# Patient Record
Sex: MALE | Race: WHITE | Employment: FULL TIME | URBAN - METROPOLITAN AREA
[De-identification: names, ages, dates, MRNs, and addresses within clinical notes are randomized per-mention and may not be internally consistent; named-entity substitution may affect disease eponyms.]

---

## 2018-05-16 ENCOUNTER — OFFICE VISIT (OUTPATIENT)
Dept: ORTHOPEDIC SURGERY | Age: 53
End: 2018-05-16

## 2018-05-16 VITALS
WEIGHT: 240 LBS | SYSTOLIC BLOOD PRESSURE: 154 MMHG | HEIGHT: 72 IN | BODY MASS INDEX: 32.51 KG/M2 | DIASTOLIC BLOOD PRESSURE: 87 MMHG | HEART RATE: 68 BPM

## 2018-05-16 DIAGNOSIS — M19.012 OSTEOARTHRITIS OF LEFT SHOULDER, UNSPECIFIED OSTEOARTHRITIS TYPE: ICD-10-CM

## 2018-05-16 DIAGNOSIS — M25.512 LEFT SHOULDER PAIN, UNSPECIFIED CHRONICITY: Primary | ICD-10-CM

## 2018-05-16 PROCEDURE — 99203 OFFICE O/P NEW LOW 30 MIN: CPT | Performed by: ORTHOPAEDIC SURGERY

## 2018-05-16 RX ORDER — AMLODIPINE BESYLATE 5 MG/1
TABLET ORAL
COMMUNITY
Start: 2018-05-06

## 2018-05-16 RX ORDER — NEBIVOLOL HYDROCHLORIDE 10 MG/1
TABLET ORAL
COMMUNITY
Start: 2018-05-06

## 2018-05-16 RX ORDER — LOSARTAN POTASSIUM AND HYDROCHLOROTHIAZIDE 12.5; 1 MG/1; MG/1
TABLET ORAL
COMMUNITY
Start: 2018-05-06

## 2018-05-23 ENCOUNTER — OFFICE VISIT (OUTPATIENT)
Dept: ORTHOPEDIC SURGERY | Age: 53
End: 2018-05-23

## 2018-05-23 VITALS
HEART RATE: 70 BPM | HEIGHT: 72 IN | BODY MASS INDEX: 32.51 KG/M2 | SYSTOLIC BLOOD PRESSURE: 115 MMHG | WEIGHT: 240 LBS | DIASTOLIC BLOOD PRESSURE: 87 MMHG

## 2018-05-23 DIAGNOSIS — G54.5 PARSONAGE-TURNER SYNDROME: Primary | ICD-10-CM

## 2018-05-23 PROCEDURE — 99213 OFFICE O/P EST LOW 20 MIN: CPT | Performed by: ORTHOPAEDIC SURGERY

## 2018-06-08 ENCOUNTER — TELEPHONE (OUTPATIENT)
Dept: ORTHOPEDIC SURGERY | Age: 53
End: 2018-06-08

## 2018-06-14 ENCOUNTER — OFFICE VISIT (OUTPATIENT)
Dept: ORTHOPEDIC SURGERY | Age: 53
End: 2018-06-14

## 2018-06-14 VITALS
SYSTOLIC BLOOD PRESSURE: 140 MMHG | HEIGHT: 72 IN | BODY MASS INDEX: 32.52 KG/M2 | DIASTOLIC BLOOD PRESSURE: 80 MMHG | WEIGHT: 240.08 LBS | HEART RATE: 56 BPM

## 2018-06-14 DIAGNOSIS — G54.5 PARSONAGE-TURNER SYNDROME: Primary | ICD-10-CM

## 2018-06-14 DIAGNOSIS — M25.512 LEFT SHOULDER PAIN, UNSPECIFIED CHRONICITY: ICD-10-CM

## 2018-06-14 DIAGNOSIS — M19.012 OSTEOARTHRITIS OF LEFT SHOULDER, UNSPECIFIED OSTEOARTHRITIS TYPE: ICD-10-CM

## 2018-06-14 PROCEDURE — 99213 OFFICE O/P EST LOW 20 MIN: CPT | Performed by: ORTHOPAEDIC SURGERY

## 2018-06-22 ENCOUNTER — HOSPITAL ENCOUNTER (OUTPATIENT)
Dept: PHYSICAL THERAPY | Age: 53
Discharge: HOME | End: 2018-06-30
Admitting: ORTHOPAEDIC SURGERY

## 2018-07-01 ENCOUNTER — HOSPITAL ENCOUNTER (OUTPATIENT)
Dept: PHYSICAL THERAPY | Age: 53
Discharge: HOME | End: 2018-07-01
Attending: ORTHOPAEDIC SURGERY | Admitting: ORTHOPAEDIC SURGERY

## 2020-01-23 ENCOUNTER — HOSPITAL ENCOUNTER (INPATIENT)
Age: 55
LOS: 2 days | Discharge: HOME | DRG: 066 | End: 2020-01-25
Attending: EMERGENCY MEDICINE | Admitting: INTERNAL MEDICINE
Payer: COMMERCIAL

## 2020-01-23 ENCOUNTER — APPOINTMENT (OUTPATIENT)
Dept: CT IMAGING | Age: 55
DRG: 066 | End: 2020-01-23
Payer: COMMERCIAL

## 2020-01-23 PROBLEM — I63.9 ACUTE CVA (CEREBROVASCULAR ACCIDENT) (HCC): Status: ACTIVE | Noted: 2020-01-23

## 2020-01-23 LAB
ALBUMIN SERPL-MCNC: 4.5 G/DL (ref 3.4–5)
ALP BLD-CCNC: 73 U/L (ref 40–129)
ALT SERPL-CCNC: 50 U/L (ref 10–40)
APTT: 28.8 SEC (ref 24.2–36.2)
AST SERPL-CCNC: 33 U/L (ref 15–37)
BASE EXCESS VENOUS: 3 MMOL/L (ref -2–3)
BASOPHILS ABSOLUTE: 0.1 K/UL (ref 0–0.2)
BASOPHILS RELATIVE PERCENT: 0.6 %
BILIRUB SERPL-MCNC: 0.7 MG/DL (ref 0–1)
BILIRUBIN DIRECT: <0.2 MG/DL (ref 0–0.3)
BILIRUBIN, INDIRECT: ABNORMAL MG/DL (ref 0–1)
CARBOXYHEMOGLOBIN: 2.3 % (ref 0–1.5)
EOSINOPHILS ABSOLUTE: 0.2 K/UL (ref 0–0.6)
EOSINOPHILS RELATIVE PERCENT: 1.9 %
HCO3 VENOUS: 27.5 MMOL/L (ref 24–28)
HCT VFR BLD CALC: 44.8 % (ref 40.5–52.5)
HEMOGLOBIN, VEN, REDUCED: 20.6 %
HEMOGLOBIN: 15.6 G/DL (ref 13.5–17.5)
INR BLD: 0.97 (ref 0.86–1.14)
LYMPHOCYTES ABSOLUTE: 3.1 K/UL (ref 1–5.1)
LYMPHOCYTES RELATIVE PERCENT: 25.3 %
MCH RBC QN AUTO: 32.3 PG (ref 26–34)
MCHC RBC AUTO-ENTMCNC: 34.9 G/DL (ref 31–36)
MCV RBC AUTO: 92.6 FL (ref 80–100)
METHEMOGLOBIN VENOUS: 0.4 % (ref 0–1.5)
MONOCYTES ABSOLUTE: 1.1 K/UL (ref 0–1.3)
MONOCYTES RELATIVE PERCENT: 8.7 %
NEUTROPHILS ABSOLUTE: 7.8 K/UL (ref 1.7–7.7)
NEUTROPHILS RELATIVE PERCENT: 63.5 %
O2 SAT, VEN: 79 %
PCO2, VEN: 43.6 MMHG (ref 41–51)
PDW BLD-RTO: 13.5 % (ref 12.4–15.4)
PH VENOUS: 7.42 (ref 7.35–7.45)
PLATELET # BLD: 260 K/UL (ref 135–450)
PMV BLD AUTO: 7.7 FL (ref 5–10.5)
PO2, VEN: 41.5 MMHG (ref 25–40)
PRO-BNP: 42 PG/ML (ref 0–124)
PROTHROMBIN TIME: 11.2 SEC (ref 10–13.2)
RBC # BLD: 4.84 M/UL (ref 4.2–5.9)
TCO2 CALC VENOUS: 29 MMOL/L
TOTAL PROTEIN: 7.6 G/DL (ref 6.4–8.2)
TROPONIN: <0.01 NG/ML
WBC # BLD: 12.3 K/UL (ref 4–11)

## 2020-01-23 PROCEDURE — 85025 COMPLETE CBC W/AUTO DIFF WBC: CPT

## 2020-01-23 PROCEDURE — 85610 PROTHROMBIN TIME: CPT

## 2020-01-23 PROCEDURE — 93005 ELECTROCARDIOGRAM TRACING: CPT | Performed by: EMERGENCY MEDICINE

## 2020-01-23 PROCEDURE — 70496 CT ANGIOGRAPHY HEAD: CPT

## 2020-01-23 PROCEDURE — 83880 ASSAY OF NATRIURETIC PEPTIDE: CPT

## 2020-01-23 PROCEDURE — 82803 BLOOD GASES ANY COMBINATION: CPT

## 2020-01-23 PROCEDURE — 1200000000 HC SEMI PRIVATE

## 2020-01-23 PROCEDURE — 85730 THROMBOPLASTIN TIME PARTIAL: CPT

## 2020-01-23 PROCEDURE — 36415 COLL VENOUS BLD VENIPUNCTURE: CPT

## 2020-01-23 PROCEDURE — 6360000004 HC RX CONTRAST MEDICATION: Performed by: PHYSICIAN ASSISTANT

## 2020-01-23 PROCEDURE — 80076 HEPATIC FUNCTION PANEL: CPT

## 2020-01-23 PROCEDURE — 99285 EMERGENCY DEPT VISIT HI MDM: CPT

## 2020-01-23 PROCEDURE — 84484 ASSAY OF TROPONIN QUANT: CPT

## 2020-01-23 PROCEDURE — 70450 CT HEAD/BRAIN W/O DYE: CPT

## 2020-01-23 RX ORDER — SODIUM CHLORIDE 0.9 % (FLUSH) 0.9 %
10 SYRINGE (ML) INJECTION EVERY 12 HOURS SCHEDULED
Status: DISCONTINUED | OUTPATIENT
Start: 2020-01-23 | End: 2020-01-25 | Stop reason: HOSPADM

## 2020-01-23 RX ORDER — ATORVASTATIN CALCIUM 80 MG/1
80 TABLET, FILM COATED ORAL NIGHTLY
Status: DISCONTINUED | OUTPATIENT
Start: 2020-01-24 | End: 2020-01-25 | Stop reason: HOSPADM

## 2020-01-23 RX ORDER — AMLODIPINE BESYLATE 5 MG/1
5 TABLET ORAL NIGHTLY
Status: DISCONTINUED | OUTPATIENT
Start: 2020-01-24 | End: 2020-01-25 | Stop reason: HOSPADM

## 2020-01-23 RX ORDER — LABETALOL 20 MG/4 ML (5 MG/ML) INTRAVENOUS SYRINGE
10 EVERY 10 MIN PRN
Status: DISCONTINUED | OUTPATIENT
Start: 2020-01-23 | End: 2020-01-25 | Stop reason: HOSPADM

## 2020-01-23 RX ORDER — ASPIRIN 81 MG/1
81 TABLET ORAL DAILY
Status: DISCONTINUED | OUTPATIENT
Start: 2020-01-24 | End: 2020-01-25 | Stop reason: HOSPADM

## 2020-01-23 RX ORDER — ONDANSETRON 2 MG/ML
4 INJECTION INTRAMUSCULAR; INTRAVENOUS EVERY 6 HOURS PRN
Status: DISCONTINUED | OUTPATIENT
Start: 2020-01-23 | End: 2020-01-25 | Stop reason: HOSPADM

## 2020-01-23 RX ORDER — LOSARTAN POTASSIUM AND HYDROCHLOROTHIAZIDE 12.5; 1 MG/1; MG/1
1 TABLET ORAL DAILY
Status: DISCONTINUED | OUTPATIENT
Start: 2020-01-24 | End: 2020-01-25 | Stop reason: HOSPADM

## 2020-01-23 RX ORDER — SODIUM CHLORIDE 0.9 % (FLUSH) 0.9 %
10 SYRINGE (ML) INJECTION PRN
Status: DISCONTINUED | OUTPATIENT
Start: 2020-01-23 | End: 2020-01-25 | Stop reason: HOSPADM

## 2020-01-23 RX ORDER — ASPIRIN 300 MG/1
300 SUPPOSITORY RECTAL DAILY
Status: DISCONTINUED | OUTPATIENT
Start: 2020-01-24 | End: 2020-01-25 | Stop reason: HOSPADM

## 2020-01-23 RX ORDER — NEBIVOLOL 5 MG/1
10 TABLET ORAL DAILY
Status: DISCONTINUED | OUTPATIENT
Start: 2020-01-24 | End: 2020-01-25 | Stop reason: HOSPADM

## 2020-01-23 RX ADMIN — IOPAMIDOL 80 ML: 755 INJECTION, SOLUTION INTRAVENOUS at 20:41

## 2020-01-24 ENCOUNTER — APPOINTMENT (OUTPATIENT)
Dept: MRI IMAGING | Age: 55
DRG: 066 | End: 2020-01-24
Payer: COMMERCIAL

## 2020-01-24 LAB
ANION GAP SERPL CALCULATED.3IONS-SCNC: 15 MMOL/L (ref 3–16)
BILIRUBIN URINE: NEGATIVE
BLOOD, URINE: ABNORMAL
BUN BLDV-MCNC: 12 MG/DL (ref 7–20)
CALCIUM SERPL-MCNC: 9.4 MG/DL (ref 8.3–10.6)
CHLORIDE BLD-SCNC: 99 MMOL/L (ref 99–110)
CHOLESTEROL, TOTAL: 150 MG/DL (ref 0–199)
CLARITY: CLEAR
CO2: 24 MMOL/L (ref 21–32)
COLOR: YELLOW
CREAT SERPL-MCNC: 0.8 MG/DL (ref 0.9–1.3)
EKG ATRIAL RATE: 56 BPM
EKG DIAGNOSIS: NORMAL
EKG P AXIS: 55 DEGREES
EKG P-R INTERVAL: 188 MS
EKG Q-T INTERVAL: 450 MS
EKG QRS DURATION: 118 MS
EKG QTC CALCULATION (BAZETT): 434 MS
EKG R AXIS: 16 DEGREES
EKG T AXIS: 44 DEGREES
EKG VENTRICULAR RATE: 56 BPM
ESTIMATED AVERAGE GLUCOSE: 139.9 MG/DL
GFR AFRICAN AMERICAN: >60
GFR NON-AFRICAN AMERICAN: >60
GLUCOSE BLD-MCNC: 151 MG/DL (ref 70–99)
GLUCOSE URINE: NEGATIVE MG/DL
HBA1C MFR BLD: 6.5 %
HDLC SERPL-MCNC: 40 MG/DL (ref 40–60)
KETONES, URINE: NEGATIVE MG/DL
LDL CHOLESTEROL CALCULATED: 85 MG/DL
LEUKOCYTE ESTERASE, URINE: NEGATIVE
MICROSCOPIC EXAMINATION: YES
NITRITE, URINE: NEGATIVE
PH UA: 6 (ref 5–8)
POTASSIUM SERPL-SCNC: 3.5 MMOL/L (ref 3.5–5.1)
PROTEIN UA: NEGATIVE MG/DL
RBC UA: ABNORMAL /HPF (ref 0–2)
SODIUM BLD-SCNC: 138 MMOL/L (ref 136–145)
SPECIFIC GRAVITY UA: 1.02 (ref 1–1.03)
TRIGL SERPL-MCNC: 124 MG/DL (ref 0–150)
URINE REFLEX TO CULTURE: ABNORMAL
URINE TYPE: ABNORMAL
UROBILINOGEN, URINE: 0.2 E.U./DL
VLDLC SERPL CALC-MCNC: 25 MG/DL
WBC UA: ABNORMAL /HPF (ref 0–5)

## 2020-01-24 PROCEDURE — 92523 SPEECH SOUND LANG COMPREHEN: CPT

## 2020-01-24 PROCEDURE — 80048 BASIC METABOLIC PNL TOTAL CA: CPT

## 2020-01-24 PROCEDURE — 70551 MRI BRAIN STEM W/O DYE: CPT

## 2020-01-24 PROCEDURE — 97162 PT EVAL MOD COMPLEX 30 MIN: CPT

## 2020-01-24 PROCEDURE — 81001 URINALYSIS AUTO W/SCOPE: CPT

## 2020-01-24 PROCEDURE — 36415 COLL VENOUS BLD VENIPUNCTURE: CPT

## 2020-01-24 PROCEDURE — 80061 LIPID PANEL: CPT

## 2020-01-24 PROCEDURE — 97535 SELF CARE MNGMENT TRAINING: CPT

## 2020-01-24 PROCEDURE — 2580000003 HC RX 258: Performed by: INTERNAL MEDICINE

## 2020-01-24 PROCEDURE — 83036 HEMOGLOBIN GLYCOSYLATED A1C: CPT

## 2020-01-24 PROCEDURE — 6360000002 HC RX W HCPCS: Performed by: INTERNAL MEDICINE

## 2020-01-24 PROCEDURE — 1200000000 HC SEMI PRIVATE

## 2020-01-24 PROCEDURE — 6370000000 HC RX 637 (ALT 250 FOR IP): Performed by: INTERNAL MEDICINE

## 2020-01-24 PROCEDURE — 97116 GAIT TRAINING THERAPY: CPT

## 2020-01-24 PROCEDURE — 94660 CPAP INITIATION&MGMT: CPT

## 2020-01-24 PROCEDURE — 92610 EVALUATE SWALLOWING FUNCTION: CPT

## 2020-01-24 PROCEDURE — 97166 OT EVAL MOD COMPLEX 45 MIN: CPT

## 2020-01-24 RX ADMIN — ENOXAPARIN SODIUM 40 MG: 40 INJECTION SUBCUTANEOUS at 09:01

## 2020-01-24 RX ADMIN — Medication 10 ML: at 00:15

## 2020-01-24 RX ADMIN — AMLODIPINE BESYLATE 5 MG: 5 TABLET ORAL at 21:58

## 2020-01-24 RX ADMIN — ASPIRIN 81 MG: 81 TABLET, COATED ORAL at 09:01

## 2020-01-24 RX ADMIN — Medication 10 ML: at 09:01

## 2020-01-24 RX ADMIN — ATORVASTATIN CALCIUM 80 MG: 80 TABLET, FILM COATED ORAL at 21:58

## 2020-01-24 RX ADMIN — Medication 10 ML: at 21:59

## 2020-01-24 RX ADMIN — NEBIVOLOL HYDROCHLORIDE 10 MG: 5 TABLET ORAL at 09:01

## 2020-01-24 RX ADMIN — LOSARTAN POTASSIUM AND HYDROCHLOROTHIAZIDE 1 TABLET: 100; 12.5 TABLET, FILM COATED ORAL at 09:01

## 2020-01-24 ASSESSMENT — PAIN SCALES - GENERAL: PAINLEVEL_OUTOF10: 0

## 2020-01-24 NOTE — PROGRESS NOTES
Occupational Therapy   Occupational Therapy Initial Assessment/Tx Note  Date: 2020   Patient Name: Sushma Rivas  MRN: [id number]     : 1965    Date of Service: 2020  Assessment: Functional mobility and ADL performance decreased from baseline. Pt aware of R sided weakness, but not aware of all deficits such as decreased balance. Pt able to perform ADLs with CGA and increased time for fine motor control deficits. Pt demo decreased balance with functional mobilty. Pt would benefit from continued inpt OT services until d/c. Recommend OP OT services upon d/c. Discharge Recommendations:Chris Fields scored a 20/24 on the AM-PAC ADL Inpatient form. Current research shows that an AM-PAC score of 18 or greater is typically associated with a discharge to the patient's home setting. Based on the patients AM-PAC score and their current ADL deficits, it is recommended that the patient have 2-3 sessions per week of Occupational Therapy at d/c to increase the patients independence. See assessment. OT Equipment Recommendations  Equipment Needed: No    Assessment   Performance deficits / Impairments: Decreased functional mobility ; Decreased strength;Decreased balance;Decreased high-level IADLs;Decreased fine motor control  Assessment: Functional mobility and ADL performance decreased from baseline. Pt aware of R sided weakness, but not aware of all deficits such as decreased balance. Pt able to perform ADLs with CGA and increased time for fine motor control deficits. Pt demo decreased balance with functional mobilty. Pt would benefit from continued inpt OT services until d/c. Recommend OP OT services upon d/c.    Treatment Diagnosis: decreased activity tolerance; impaired ADLs, fine motor control and balance   Decision Making: Medium Complexity  OT Education: OT Role;Plan of Care  REQUIRES OT FOLLOW UP: Yes  Activity Tolerance  Activity Tolerance: Patient Tolerated treatment well  Safety Devices  Safety Devices in place: Yes  Type of devices: Call light within reach;Nurse notified; Left in chair;Chair alarm in place           Treatment Diagnosis: decreased activity tolerance; impaired ADLs, fine motor control and balance       Restrictions  Position Activity Restriction  Other position/activity restrictions: Up as tolerated    Subjective   General  Chart Reviewed: Yes  Patient assessed for rehabilitation services?: Yes  Additional Pertinent Hx: 62 y.o. M admitted 1/23 for R sided weakness and slurred speech. CT head/neck shows no intracranial large vessel occlusion or significant stenosis. PMHx: HTN. Family / Caregiver Present: Yes(Friend entered room mid therapy session )  Referring Practitioner: Jacobo Tijerina  Diagnosis: Acute CVA   Subjective  Subjective: Pt in bed upon arrival. Pleasant and agreeable to therapy. \" I didnt realize how difficult it is to comb my hair now that my arm is weak. \"   Patient Currently in Pain: Denies  Pain Assessment  Pain Assessment: 0-10(No c/o pain )    Social/Functional History  Social/Functional History  Lives With: Family(Mother)  Type of Home: House  Home Layout: Bed/Bath upstairs  Home Access: Stairs to enter without rails  Entrance Stairs - Number of Steps: 3 long ANTON  Bathroom Shower/Tub: Tub/Shower unit  Bathroom Toilet: Handicap height  Bathroom Equipment: Hand-held shower  Bathroom Accessibility: Accessible  Home Equipment: 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 Brother is coming from [facility] [place] to stay for a while and help as needed.        Objective   Vision: Impaired  Hearing: Within functional limits

## 2020-01-24 NOTE — H&P
Hospital Medicine History & Physical      PCP: Krishan Cuadra MD    Date of Admission: 1/23/2020    Date of Service: Pt seen/examined on 1/23/2020 and Admitted to Inpatient with expected LOS greater than two midnights due to medical therapy. Chief Complaint: Slurred speech and right-sided weakness      History Of Present Illness:     62 y.o. male who presents with slurred speech that started around 9:30 AM today. Patient was not very concerned and did not seek medical attention at that time. Around 8:30 PM today patient started having gait problem/imbalance due to right-sided weakness-arm and the leg both. Patient was not able to ambulate due to this. Patient denies prior history of TIA or stroke or heart disease    Patient presented to the ER well out of 3-hour window since onset of his symptoms at 9:30 AM today. Stroke team was consulted and patient was not a t-PA candidate. According to the patient his speech is improving significantly, but completely not at his baseline. He was able to ambulate to the bathroom after coming upstairs to the room with supervision. Patient states that his right-sided weakness is almost resolved. Feels that his speech is slightly slow but improving. Has history of obstructive sleep apnea and uses CPAP and history of hypertension. Denies tobacco smoking, alcohol or drug use. States he smokes cigars few times a year on special occasions. Past Medical History:          Diagnosis Date    Hypertension        Past Surgical History:      History reviewed. No pertinent surgical history. Medications Prior to Admission:      Prior to Admission medications    Medication Sig Start Date End Date Taking?  Authorizing Provider   BYSTOLIC 10 MG tablet  8/7/83   Historical Provider, MD   amLODIPine (NORVASC) 5 MG tablet  5/6/18   Historical Provider, MD   losartan-hydrochlorothiazide (HYZAAR) 100-12.5 MG per tablet  5/6/18   Historical Provider, MD       Allergies: 12.3*   HGB 15.6   HCT 44.8        No results for input(s): NA, K, CL, CO2, BUN, CREATININE, CALCIUM, PHOS in the last 72 hours. Invalid input(s): MAGNES  Recent Labs     01/23/20 2030   AST 33   ALT 50*   BILIDIR <0.2   BILITOT 0.7   ALKPHOS 73     Recent Labs     01/23/20 2030   INR 0.97     Recent Labs     01/23/20 2030   TROPONINI <0.01       Urinalysis:    No results found for: Oscar Ziegler, [address], [place] [address], RBCUA, BLOODU, [address], [place] [address]    Radiology:     CXR: I have reviewed the CXR with the following interpretation: NA  EKG:  I have reviewed the EKG with the following interpretation: Ordered    CTA HEAD NECK W CONTRAST   Final Result      No carotid or vertebral artery stenosis below skull base         CTA HEAD:      ANTERIOR CIRCULATION: There is mild calcification of the left internal carotid artery cavernous segment without evidence for significant stenosis. The intracranial right internal carotid artery is normal. Bilateral anterior and middle cerebral arteries    are normal. There is patent anterior communicating artery. POSTERIOR CIRCULATION: There is mild focal calcification of the intracranial right internal carotid artery without significant stenosis. Left internal carotid artery is normal. The basilar artery and branch vessels are normal. Prominent right posterior    communicating artery is visible. INTRACRANIAL VENOUS SYSTEM: No evidence for intracranial venous thrombosis. INTRACRANIAL HEMORRHAGE: None. VENTRICLES: Normal in size and configuration for age. BRAIN PARENCHYMA: Gray-white matter differentiation is normal. No intracranial mass effect. SKULL: No destructive osseous process or fracture. PARANASAL SINUSES / MASTOIDS: No acute sinusitis or mastoiditis. EXTRACRANIAL SOFT TISSUES: Normal.      IMPRESSION:      No intracranial large vessel occlusion or significant stenosis      CT HEAD WO CONTRAST   Final Result      1.  No intracranial

## 2020-01-24 NOTE — ED NOTES
.Patient transferred to 5th floor with report given to Cynthia Schmitt.       Luciana Barrientos, RN  01/23/20 2320

## 2020-01-24 NOTE — PROGRESS NOTES
Hospitalist Progress Note      PCP: Catherine Solitario MD    Date of Admission: 1/23/2020    Chief Complaint: Slurred speech, R sided weakness    Subjective:  Patient seen and examined at the bedside. No complaints at this time. Continues to have some dysarthria and right sided weakness. PFHS: reviewed as documented 1/23/2020, no changes      Medications:  Reviewed    Infusion Medications   Scheduled Medications    amLODIPine  5 mg Oral Nightly    nebivolol  10 mg Oral Daily    losartan-hydrochlorothiazide  1 tablet Oral Daily    sodium chloride flush  10 mL Intravenous 2 times per day    enoxaparin  40 mg Subcutaneous Daily    aspirin  81 mg Oral Daily    Or    aspirin  300 mg Rectal Daily    atorvastatin  80 mg Oral Nightly     PRN Meds: sodium chloride flush, magnesium hydroxide, ondansetron, perflutren lipid microspheres, labetalol      Intake/Output Summary (Last 24 hours) at 1/24/2020 0850  Last data filed at 1/24/2020 0053  Gross per 24 hour   Intake 100 ml   Output --   Net 100 ml         Physical Exam Performed:    BP (!) 150/88   Pulse 50   Temp 98.5 °F (36.9 °C) (Oral)   Resp 16   Ht 6' (1.829 m)   Wt 250 lb (113.4 kg)   SpO2 96%   BMI 33.91 kg/m²      General appearance:  No apparent distress, appears stated age  Eyes: Pupils equal, round, reactive to light, conjunctiva/corneas clear  Ears/Nose/Mouth/Throat: No external lesions or scars, hearing intact to voice  Neck: Trachea midline, no masses noted  Respiratory:  Normal respiratory effort. Clear to auscultation bilaterally  Cardiovascular: Regular rate and rhythm, nl S1/S2, w/o murmurs, rubs or gallops, no lower extremity edema  Abdomen: Soft, non-tender, non-distended, no hepatosplenomegaly  Musculoskeletal: No cyanosis, clubbing or petechiae, no lower extremity misalignment, asymmetry, or crepitation  Skin: Normal skin color, texture, turgor. No rashes or lesions noted.   Neuro: Dysarthric, otherwise CN grossly intact, Strength 4/5 large vessel occlusion or significant stenosis      CT HEAD WO CONTRAST   Final Result      1.  No intracranial hemorrhage, mass effect or large acute territorial infarction      MRI brain without contrast    (Results Pending)         Assessment/Plan:    Active Hospital Problems    Diagnosis Date Noted    Acute CVA (cerebrovascular accident) ([facility] [address] [I63.9] 01/23/2020       Plan:    # Acute CVA  -CT head, CTA head/neck, all negative  -Telemetry monitoring  -f/u A1C, lipid panel  -ASA, high-intensity statin  -Echo today  -MRI of head today  -Neurology consult  -PT/OT/SLP    # Hypertension  -Permissive HTN for 24h  -Then restart home medications as appropriate    # IMER  -Continue home CPAP    DVT Prophylaxis: Lovenox  Diet: DIET CARDIAC;  Code Status: Full Code    PT/OT Eval Status: ordered, pending    Antionette Em MD    # Medical Decision Making Complexity: high    Problem   New problem, additional workup (4): acute CVA    Established problem, stable (1): IMER on CPAP   Established problem, stable (1): Hypertension    Risk:  High:   Life-threatening Illness/Injury: Acute CVA

## 2020-01-24 NOTE — CARE COORDINATION
Case Management Assessment           Initial Evaluation                Date / Time of Evaluation: 1/24/2020 6:49 PM                 Assessment Completed by: Christin Conway     Spoke with patient regarding discharge needs and pt denies needs. He does not feel he will need any DME or home care. Pt will have transport to home. Patient Name: Melissa Latham     YOB: 1965  Diagnosis: Acute CVA (cerebrovascular accident) [facility]) [I63.9]     Date / Time: 1/23/2020  8:21 PM    Patient Admission Status: Inpatient    If patient is discharged prior to next notation, then this note serves as note for discharge by case management. Current PCP: Luz Fernandez MD  Clinic Patient: No    Chart Reviewed: Yes  Patient/ Family Interviewed: Yes    Initial assessment completed at bedside with: patient    Hospitalization in the last 30 days: No    Emergency Contacts:  Extended Emergency Contact Information  Primary Emergency Contact: Molly Cook [address] Phone: [phone]  Mobile Phone: [phone]  Relation: Parent    Advance Directives:   Code Status: Full [address]: No  Agent: NA  Contact Number: NA    Financial  Payor: Tyler Masters / Plan: Keegan Boyer PPO / Product Type: *No Product type* /     Pre-cert required for SNF: Yes    Pharmacy    [facility] [address] [place] [address] [phone]  [place]  [phone]  Phone: [phone] Fax: [phone]      Potential assistance Purchasing Medications: Potential Assistance Purchasing Medications: No  Does Patient want to participate in local refill/ meds to beds program?: No    Meds To Beds General Rules:  1. Can ONLY be done Monday- Friday between 8:30am-5pm  2. Prescription(s) must be in pharmacy by 3pm to be filled same day  3. Copy of patient's insurance/ prescription drug card and patient face sheet must be sent along with the prescription(s)  4.  Cost of Rx cannot be added to hospital bill. If financial assistance is needed, please contact unit  or ;  or  CANNOT provide pharmacy voucher for patients co-pays  5. Patients can then  the prescription on their way out of the hospital at discharge, or pharmacy can deliver to the bedside if staff is available. (payment due at time of pick-up or delivery - cash, check, or card accepted)     Able to afford home medications/ co-pay costs: Yes    ADLS  Support Systems: Parent    PT AM-PAC: 19 /24  OT AM-PAC: 20 /24    [place]: single family home  Steps: 3    Plans to RETURN to current housing: Yes  Barriers to RETURNING to current housing: none noted      DISCHARGE PLAN:  Disposition: Home- No Services Needed    Transportation PLAN for discharge: family     Factors facilitating achievement of predicted outcomes: Family support, Cooperative and Pleasant    Barriers to discharge: NA    Additional Case Management Notes: NA    The Plan for Transition of Care is related to the following treatment goals of Acute CVA (cerebrovascular accident) ([facility] [address] [I63.9]    The Patient and/or patient representative Merlin Toth and his family were provided with a choice of provider and agrees with the discharge plan Not Indicated    Freedom of choice list was provided with basic dialogue that supports the patient's individualized plan of care/goals and shares the quality data associated with the providers.  Not Indicated      Care Transition patient: Gissell Avrey Si, RN  [facility]  Case Management Department  Ph: [phone]   Fax: [phone]

## 2020-01-24 NOTE — PROGRESS NOTES
4 Eyes Admission Assessment     I agree as the admission nurse that 2 RN's have performed a thorough Head to Toe Skin Assessment on the patient. ALL assessment sites listed below have been assessed on admission. Areas assessed by both nurses:   [x]   Head, Face, and Ears   [x]   Shoulders, Back, and Chest  [x]   Arms, Elbows, and Hands   [x]   Coccyx, Sacrum, and Ischum  [x]   Legs, Feet, and Heels        Does the Patient have Skin Breakdown?   No         Francis Prevention initiated:  No   Wound Care Orders initiated:  No      [facility] nurse consulted for Pressure Injury (Stage 3,4, Unstageable, DTI, NWPT, and Complex wounds):  No      Nurse 1 eSignature: Electronically signed by Letty León RN on 1/24/20 at 3:24 AM    **SHARE this note so that the co-signing nurse is able to place an eSignature**    Nurse 2 eSignature: Electronically signed by Sonal Zuniga RN on 1/24/20 at 3:24 AM

## 2020-01-24 NOTE — PLAN OF CARE
Problem: Falls - Risk of:  Goal: Will remain free from falls  Description  Will remain free from falls  1/24/2020 1709 by Anastacio Zavala RN  Outcome: Met This Shift  1/24/2020 0313 by Carolina Brandon RN  Outcome: Ongoing  Note:   Patient is a high fall risk. All fall precautions in place: bed alarm on, nonskid socks on pt, call light within reach, bed locked in lowest position. Will continue to monitor pt safety. Goal: Absence of physical injury  Description  Absence of physical injury  Outcome: Met This Shift     Problem: HEMODYNAMIC STATUS  Goal: Patient has stable vital signs and fluid balance  1/24/2020 1709 by Anastacio Zavala RN  Outcome: Met This Shift  1/24/2020 0313 by Carolina Brandon RN  Outcome: Ongoing  Note:   VSS with exception to BP being slightly elevated. Pt passed swallow screen and is tolerating diet. Will continue to monitor.       Problem: ACTIVITY INTOLERANCE/IMPAIRED MOBILITY  Goal: Mobility/activity is maintained at optimum level for patient  Outcome: Met This Shift     Problem: COMMUNICATION IMPAIRMENT  Goal: Ability to express needs and understand communication  Outcome: Met This Shift

## 2020-01-24 NOTE — PROGRESS NOTES
Stroke Admission    I agree as the admission nurse that I have completed a thorough stroke assessment and completed the admission on the patient. ALL assessment areas listed below have been addressed and completed. Presentation: TIA    Handoff assessment completed with Pt transferred with ED tech, unable to assess with ED RN    Current NIHSS 4.     [x]   Education Assessment  [x]   Individualized Stroke/TIA Education template added, including patient specific risk factors: Hypertension, Overweight, Lack of exercise and Sleep apnea  [x]   Individualized Stroke/TIA Care Plan template added  [x]   Swallow screen completed using the [employer] Protocol, and documented on flowsheet PRIOR to oral intake: Pass  [x]   VTE Prophylaxis ordered/addressed; SCDs: N/A Lovenox  [x]   Stroke booklet given, and education initiated with patient and/or caregiver      Nurse eSignature: Electronically signed by Romelia Smith RN on 1/24/20 at 1:31 AM

## 2020-01-24 NOTE — ED NOTES
.Rounded on patient for pain, repositioning, and toileting needs. Call light and personal items within reach. Side rails up times 2.      Dannie Bynum RN  01/23/20 4930

## 2020-01-24 NOTE — PROGRESS NOTES
Speech Language Pathology  Facility/Department: [facility] 5T ORTHO/NEURO   CLINICAL BEDSIDE SWALLOW EVALUATION    NAME: Wallis Nissen  : 1965  MRN: [id number]    ADMISSION DATE: 2020  ADMITTING DIAGNOSIS: has Acute CVA (cerebrovascular accident) ([address] on their problem list.  ONSET DATE: 2020    Recent Chest Xray/CT of Chest: none    Date of Eval: 2020  Evaluating Therapist: Pua Pan    Current Diet level:  Current Diet : Regular  Current Liquid Diet : Thin    Primary Complaint  Patient Complaint: slurred speech    Pain:  Pain Assessment  Pain Assessment: 0-10  Pain Level: 0    Reason for Referral  Wallis Nissen was referred for a bedside swallow evaluation to assess the efficiency of his swallow function, identify signs and symptoms of aspiration and make recommendations regarding safe dietary consistencies, effective compensatory strategies, and safe eating environment. Impression  Dysphagia Diagnosis: Swallow function appears grossly intact  Dysphagia Impression :  Swallow function appears grossly intact  Dysphagia Outcome Severity Scale: Level 6: Within functional limits/Modified independence     Treatment Plan  Requires SLP Intervention: No  Duration/Frequency of Treatment: not indicated for dysphagia  D/C Recommendations: To be determined    Recommended Diet and Intervention  Diet Solids Recommendation: Regular  Liquid Consistency Recommendation: Thin  Recommended Form of Meds: PO    Compensatory Swallowing Strategies  N/A    Treatment/Goals  No dysphagia goals at this time    General  Chart Reviewed: Yes  Subjective  Subjective: Pleasant and cooperative. Indicates that he had no difficulty eating breakfast.    Behavior/Cognition: Alert; Cooperative;Pleasant mood  Temperature Spikes Noted: No(per flowsheet)  Respiratory Status: Room air  Breath Sounds: (WDL per flowsheet)  O2 Device: None (Room air)  Communication Observation: Dysarthria  Follows Directions: Complex  Dentition:

## 2020-01-24 NOTE — PROGRESS NOTES
arms freelying without LOB or near LOB. Min sway with NBOS, mod sway with NBOS with eyes closed. )  Standing - Dynamic: Fair(Near LOB into wall with turning in [place], occ veers to the R- moreso towards end of trial. Occ stagger when commanded to look L,R, up, down during amb. Able to stop abruptly without LOB when commanded)  Comments: Good balance upon standing, able to look over L shoulder to talk to friend, move arms freelying without LOB or near LOB. Treatment included gait and transfer training, pt education. Plan   Plan  Times per week: 5-7  Current Treatment Recommendations: Functional Mobility Training, Transfer Training, Gait Training, Balance Training, Stair training, Patient/Caregiver Education & Training  Safety Devices  Type of devices: Call light within reach, Chair alarm in place, Left in chair, Nurse notified, Gait belt    AM-PAC Score  AM-PAC Inpatient Mobility Raw Score : 19 (01/24/20 1508)  AM-PAC Inpatient T-Scale Score : 45.44 (01/24/20 1508)  Mobility Inpatient CMS 0-100% Score: 41.77 (01/24/20 1508)  Mobility Inpatient CMS G-Code Modifier : CK (01/24/20 1508)        Goals  Short term goals  Time Frame for Short term goals: Discharge  Short term goal 1: Pt will sit <-> without AD with supervision  Short term goal 2: Pt will amb 150 ft LRAD with supervision and no safety concerns  Short term goal 3: Pt will amb full flight of stairs with 1-2 railings with supervision  Patient Goals   Patient goals : Return home and to PLOF     Therapy Time   Individual Concurrent Group Co-treatment   Time In 1320         Time Out 1348         Minutes 28           Timed Code Treatment Minutes: 13    Total Treatment Minutes: 28    If patient is discharged prior to next treatment, this not will serve as the discharge summary. Mell Adame, [facility]    Therapist was present, directed the patient's care, made skilled judgment, and was responsible for assessment and treatment of the patient.     Rosangela Rodas,

## 2020-01-24 NOTE — CONSULTS
Neurology Consult Note  Reason for Consult: \"Acute CVA\"  Chief complaint: \" My right side is weak\"  Dr Lio Rapp MD asked me to see Brianna Engle in consultation for evaluation of \"Acute CVA\". History of Present Illness:  Brianna Engle is a 62 y.o. male who presents on 1/23/20 with complaints of speech difficulties and mild right sided weakness. The patient tells me that yesterday morning, he woke up in his normal state of health. Later that morning, he was on his computer and was had an abrupt onset of difficulty typing. He didn't think much of it, and laid down to rest to see if symptoms would resolve. Later that afternoon, he went downstairs to make dinner. He made dinner without any difficulty. He was walking across the room, when he suddenly noticed he was having a lot of difficulty walking. He believes this was related to his right leg becoming weak. Around that same time, he also noticed that his speech sounded slurred and like his \"tounge wasn't working\". His mother called EMS. Patient tells me that he was in the the squad and also noticed that his right arm was weak. In the ED, it was determined that he was not a TPA candidate given he was outside the time window. Per chart review, the patient's mother noticed speech changes at around 9:30 am yesterday morning that worsened with the onset of right arm and right leg weakness. Vessel imaging was also completed and was unremarkable for LVO or flow limiting stenosis. Per my interview with the patient, he states that symptoms have not changed since their onset. He still has difficulty walking given right leg weakness, but tells me that he is able to text with his right hand. He denies headache, feelings of the room spinning, double vision, and blurry vision. He has a history of hypertension, for which he takes medication. He is unsure of how well controlled his BP is.  He states that he only has it checked during provider appointments and his BP is typically in the 150s/90s during those visits. He has not had any recent changes to his medication. He denies history of atrial fibrillation, diabetes, hyperlipidemia, and smoking. He does not take a daily aspirin or statin at home. No history of stroke or TIA. Nothing like this has ever happened before. Medical History:  Past Medical History:   Diagnosis Date    Hypertension      History reviewed. No pertinent surgical history. Medications Prior to Admission: amLODIPine (NORVASC) 5 MG tablet,   BYSTOLIC 10 MG tablet,   losartan-hydrochlorothiazide (HYZAAR) 100-12.5 MG per tablet,   No Known Allergies  History reviewed. No pertinent family history. Social History     Tobacco Use   Smoking Status Never Smoker   Smokeless Tobacco Never Used     Social History     Substance and Sexual Activity   Drug Use Never     Social History     Substance and Sexual Activity   Alcohol Use Yes    Comment: occ       ROS:  Constitutional- No weight loss or fevers  Eyes- No diplopia. No photophobia. Ears/nose/throat- No dysphagia. + Dysarthria  Cardiovascular- No palpitations. No chest pain  Respiratory- No dyspnea. No Cough  Gastrointestinal- No Abdominal pain. No Vomiting. Genitourinary- No incontinence. No urinary retention  Musculoskeletal- No myalgia. No arthralgia  Skin- No rash. No easy bruising. Psychiatric- No depression. No anxiety  Endocrine- No diabetes. No thyroid issues. Hematologic- No bleeding difficulty. No fatigue  Neurologic- + weakness. No Headache. Exam:  Blood pressure (!) 155/91, pulse 54, temperature 98.5 °F (36.9 °C), temperature source Oral, resp. rate 16, height 6' (1.829 m), weight 250 lb (113.4 kg), SpO2 96 %. Constitutional    Vital signs: BP, HR, and RR reviewed   General Alert, no distress, well-nourished  Eyes: Unable to visualize the fundi  Cardiovascular: pulses symmetric in all 4 extremities. No peripheral edema.   Psychiatric: cooperative with examination, no  psychotic behavior noted. Neurologic  Mental status: Eyes open spontaneously  orientation to person, place, month, year   General fund of knowledge grossly intact, able to name current and preceding president    Memory grossly intact, able to name 1st president    Attention intact as able to attend well to the exam, able to read the clock and calculate coins   Language fluent in conversation   Comprehension intact; follows simple commands as well as 2 step commands crossing the midline  Cranial nerves:   CN2: Visual Fields full w/o extinction on confrontational testing  CN 3,4,6: extraocular muscles intact, ANDRES  CN5: facial sensation symmetric   CN7:face symmetric with mild dysarthria  CN8: hearing grossly intact  CN9: palate elevated symmetrically  CN11: trap full strength on shoulder shrug  CN12: tongue midline with protrusion  Strength: + slight drift on the RUE. No drift on the LUE. Right delt 4+/5, Right bicep/tricep 4/5, right  4/5, LUE 5/5, RLE 4/5, LLE 5/5, FFM decreased amplitude and coordination on the right, intact on the left  Deep tendon reflexes: BUEs areflexic, BLE 2+  Sensory: light touch intact in all 4 extremities. No sensory extinction on double simultaneous stimulation  Cerebellar/coordination: finger nose finger normal without ataxia, but clumsy on the right due to weakness  Tone: normal in all 4 extremities  Gait: Deferred for safety    Labs  Creatinine: 0.8  Glucose: 151  WBC: 12.3  A1C: 6.5      Studies  CT head  No intracranial hemorrhage, mass effect or large acute territorial infarction    CTA head   No intracranial large vessel occlusion or significant stenosis    CTA neck  No carotid or vertebral artery stenosis below skull base         Impression:  1. Dysarthria  2. Right hemiparesis  3. Hypertension    Genoveva Be is a 62 y.o. male who presented with right hemiparesis and dysarthria concerning for acute ischemic stroke. CT head unrevealing for acute changes.  Vessel

## 2020-01-24 NOTE — PROGRESS NOTES
multi-step, and complex directions with 100% accuracy)    Reading Comprehension  Reading Status: (not assessed at this time)    Expression  Primary Mode of Expression: Verbal  Verbal Expression  Verbal Expression: Exceptions to functional limits  Divergent: Mild (able to generate 15 semantically-related items per minute; able to generate 4 phonemically-related items per minute)    Written Expression  Dominant Hand: Right  Written Expression: Exceptions to [facility] (Mostly due to legibility. Spelling is intact.)    Motor Speech  Motor Speech: Exceptions to [facility]  Intelligibility: (100% at the conversation level)  Dysarthria : (Mild, but perceptible dysarthria. Pt reports that coordinating his mouth for speech becomes more difficult the more he talks.)    Pragmatics/Social Functioning  Pragmatics: Within functional limits    Cognition:   Orientation  Overall Orientation Status: Within Normal Limits  Attention  Attention: Within Functional Limits  Memory  Memory: Within Funtional Limits (recalls 5/5 words following 5-minute delay)  Numeric Reasoning  Numeric Reasoning: Within Functional Limits (serial 7 subtraction with 100% accuracy)    Prognosis:  Speech Therapy Prognosis  Prognosis: Excellent  Prognosis Considerations: Severity of Impairments; Age  Individuals consulted  Consulted and agree with results and recommendations: Patient;RN    Education:  Patient Education: SLP educated pt re: role of SLP, assessment results and POC. Patient Education Response: Verbalizes understanding  Safety Devices in place: Yes  Type of devices: Nurse notified; All fall risk precautions in place    Therapy Time:   Individual Concurrent Group Co-treatment   Time In 0855         Time Out 0915         Minutes 20                 Plan  Diet Recommendations:  Continue current diet (Regular texture and thin consistency liquids). Discharge Plan:  TBD  Discussed with RN, Ivis Burden Needs within reach.     Electronically Signed by:  Devonte Pierce Ayaka Huerta., [id number] [id number]  Pager #[phone]    This document will serve as a discharge summary if pt discharges before next treatment.    1/24/2020 9:46 AM

## 2020-01-24 NOTE — PLAN OF CARE
Problem: Falls - Risk of:  Goal: Will remain free from falls  Outcome: Ongoing  Note:   Patient is a high fall risk. All fall precautions in place: bed alarm on, nonskid socks on pt, call light within reach, bed locked in lowest position. Will continue to monitor pt safety. Problem: HEMODYNAMIC STATUS  Goal: Patient has stable vital signs and fluid balance  Outcome: Ongoing  Note:   VSS with exception to BP being slightly elevated. Pt passed swallow screen and is tolerating diet. Will continue to monitor.

## 2020-01-24 NOTE — ED NOTES
Patient brought by EMS for possible stroke like symptoms including aphasia, HA and gait abnormality. Patient reports symptoms began approx 0930 this am, worsened approx 1 hour ago.       Mary Jane Georges RN  01/23/20 2027

## 2020-01-24 NOTE — ED PROVIDER NOTES
ED Attending Attestation Note     Date of evaluation: 1/23/2020    This patient was seen by the advance practice provider. I have seen and examined the patient, agree with the workup, evaluation, management and diagnosis. The care plan has been discussed. I have reviewed the ECG and concur with the HENOK's interpretation. My assessment reveals a white male who presents with with speech difficulty (delay in getting words out, mild slur), also with mild right sided weakness. Patient lives with mom and reports he noticed difficulty with speech at 9:30am which did not improve throughout the day but rather got worse approximately 1 hour prior to arrival. FSBS wnl. On arrival sent to CT scan and stroke team contacted. Outside of time window and not eligible for tPA, will plan for admission.      Analisa Chanel MD  01/23/20 6171

## 2020-01-25 VITALS
TEMPERATURE: 98.1 F | WEIGHT: 250 LBS | HEART RATE: 53 BPM | BODY MASS INDEX: 33.86 KG/M2 | RESPIRATION RATE: 16 BRPM | DIASTOLIC BLOOD PRESSURE: 89 MMHG | SYSTOLIC BLOOD PRESSURE: 148 MMHG | HEIGHT: 72 IN | OXYGEN SATURATION: 93 %

## 2020-01-25 LAB
LV EF: 58 %
LVEF MODALITY: NORMAL

## 2020-01-25 PROCEDURE — 6370000000 HC RX 637 (ALT 250 FOR IP): Performed by: INTERNAL MEDICINE

## 2020-01-25 PROCEDURE — 97530 THERAPEUTIC ACTIVITIES: CPT

## 2020-01-25 PROCEDURE — 6360000004 HC RX CONTRAST MEDICATION: Performed by: INTERNAL MEDICINE

## 2020-01-25 PROCEDURE — 6360000002 HC RX W HCPCS: Performed by: INTERNAL MEDICINE

## 2020-01-25 PROCEDURE — 2580000003 HC RX 258: Performed by: INTERNAL MEDICINE

## 2020-01-25 PROCEDURE — 97116 GAIT TRAINING THERAPY: CPT

## 2020-01-25 PROCEDURE — C8929 TTE W OR WO FOL WCON,DOPPLER: HCPCS

## 2020-01-25 RX ORDER — ASPIRIN 81 MG/1
81 TABLET ORAL DAILY
Qty: 30 TABLET | Refills: 0 | Status: SHIPPED | OUTPATIENT
Start: 2020-01-26

## 2020-01-25 RX ORDER — CLOPIDOGREL BISULFATE 75 MG/1
300 TABLET ORAL ONCE
Status: COMPLETED | OUTPATIENT
Start: 2020-01-25 | End: 2020-01-25

## 2020-01-25 RX ORDER — CLOPIDOGREL BISULFATE 75 MG/1
75 TABLET ORAL DAILY
Qty: 30 TABLET | Refills: 0 | Status: SHIPPED | OUTPATIENT
Start: 2020-01-25

## 2020-01-25 RX ORDER — ATORVASTATIN CALCIUM 80 MG/1
80 TABLET, FILM COATED ORAL NIGHTLY
Qty: 30 TABLET | Refills: 3 | Status: SHIPPED | OUTPATIENT
Start: 2020-01-25

## 2020-01-25 RX ADMIN — LOSARTAN POTASSIUM AND HYDROCHLOROTHIAZIDE 1 TABLET: 100; 12.5 TABLET, FILM COATED ORAL at 11:01

## 2020-01-25 RX ADMIN — CLOPIDOGREL BISULFATE 300 MG: 75 TABLET ORAL at 14:24

## 2020-01-25 RX ADMIN — NEBIVOLOL HYDROCHLORIDE 10 MG: 5 TABLET ORAL at 11:00

## 2020-01-25 RX ADMIN — PERFLUTREN 1.65 MG: 6.52 INJECTION, SUSPENSION INTRAVENOUS at 09:30

## 2020-01-25 RX ADMIN — Medication 10 ML: at 11:04

## 2020-01-25 RX ADMIN — ASPIRIN 81 MG: 81 TABLET, COATED ORAL at 11:00

## 2020-01-25 RX ADMIN — ENOXAPARIN SODIUM 40 MG: 40 INJECTION SUBCUTANEOUS at 11:00

## 2020-01-25 ASSESSMENT — PAIN SCALES - GENERAL
PAINLEVEL_OUTOF10: 0
PAINLEVEL_OUTOF10: 0

## 2020-01-25 NOTE — PROGRESS NOTES
Occupational Therapy  Facility/Department: [facility] 5T ORTHO/NEURO  Daily Treatment Note  NAME: Mike Howe  : 1965  MRN: [id number]    Date of Service: 2020    Discharge Recommendations:    Mike Howe scored a 24/24 on the AM-PAC ADL Inpatient form. Current research shows that an AM-PAC score of 18 or greater is typically associated with a discharge to the patient's home setting. Based on the patients AM-PAC score and their current ADL deficits, it is recommended that the patient have 2-3 sessions per week of Occupational Therapy at d/c to increase the patients independence. OT Equipment Recommendations  Equipment Needed: No    Assessment   Performance deficits / Impairments: Decreased functional mobility ; Decreased strength;Decreased balance;Decreased high-level IADLs;Decreased fine motor control  Assessment: Pt with good progress and ready to go home. Treatment Diagnosis: decreased activity tolerance; impaired ADLs, fine motor control and balance   Prognosis: Good  Patient Education: HEP for coordination (handout provided); Pt verbalized understanding  REQUIRES OT FOLLOW UP: No  Activity Tolerance  Activity Tolerance: Patient Tolerated treatment well  Safety Devices  Safety Devices in place: Yes  Type of devices: Left in bed;Nurse notified     Restrictions  Position Activity Restriction  Other position/activity restrictions: Up as tolerated  Subjective   General  Chart Reviewed: Yes  Patient assessed for rehabilitation services?: Yes  Additional Pertinent Hx: 62 y.o. M admitted  for R sided weakness and slurred speech. CT head/neck shows no intracranial large vessel occlusion or significant stenosis. PMHx: HTN. Family / Caregiver Present: Yes  Referring Practitioner: Yojana Maloney  Diagnosis: Acute CVA   Subjective  Subjective: Pt sitting edge of bed upon entry. Pt expresses no concerns regarding discharge to home.   Vital Signs  Patient Currently in Pain: Denies Orientation  Orientation  Overall Orientation Status: Within Functional Limits  Objective    ADL  Grooming: Independent  LE Dressing: (Pt already dressed )  Toileting: (denied need)        Balance  Sitting Balance: Independent  Standing Balance: Independent  Standing Balance  Time: ~5 min  Activity: functional room mobility, bathroom mobility/activity  Functional Mobility  Functional - Mobility Device: No device  Activity: To/from bathroom; Other  Assist Level:  Independent     Transfers  Stand Step Transfers: Independent                       Cognition  Overall Cognitive Status: WFL                                         Plan   Plan Comment: Discharge pt from acute OT secondary to discharge to home       AM-PAC Score        AM-PAC Inpatient Daily Activity Raw Score: 24 (01/25/20 1529)  AM-PAC Inpatient ADL T-Scale Score : 57.54 (01/25/20 1529)  ADL Inpatient CMS 0-100% Score: 0 (01/25/20 1529)  ADL Inpatient CMS G-Code Modifier : [address] (01/25/20 1529)    Goals  Short term goals  Time Frame for Short term goals: by d/c   Short term goal 1: participate in lower body dressing routine - not met   Short term goal 2: dynamic standing during functional tasks with spvn      -MET-  Short term goal 3: gather supplies needed to complete ADL task with SBA     -MET-   Short term goal 4: independently complete HEP for RUE strength and coordination - not met        Therapy Time   Individual Concurrent Group Co-treatment   Time In 1443         Time Out 1453         Minutes 10         Timed Code Treatment Minutes: 10 Minutes    Total Treatment Time:10    JOCELINE Bailey/ERI [id number]

## 2020-01-25 NOTE — PROGRESS NOTES
Hospitalist Progress Note      PCP: Krishan Cuadra MD    Date of Admission: 1/23/2020    Chief Complaint: slurred speech, R sided weakness    Subjective:  Patient seen and examined at the bedside. No complaints at this time. Speech markedly improved, and is now at baseline as is his strength. Overall he feels at his baseline. PFHS: reviewed as documented 1/23/2020, no changes      Medications:  Reviewed    Infusion Medications   Scheduled Medications    clopidogrel  300 mg Oral Once    [Held by provider] amLODIPine  5 mg Oral Nightly    nebivolol  10 mg Oral Daily    losartan-hydrochlorothiazide  1 tablet Oral Daily    sodium chloride flush  10 mL Intravenous 2 times per day    enoxaparin  40 mg Subcutaneous Daily    aspirin  81 mg Oral Daily    Or    aspirin  300 mg Rectal Daily    atorvastatin  80 mg Oral Nightly     PRN Meds: sodium chloride flush, magnesium hydroxide, ondansetron, labetalol      Intake/Output Summary (Last 24 hours) at 1/25/2020 1154  Last data filed at 1/25/2020 0824  Gross per 24 hour   Intake 1050 ml   Output 0 ml   Net 1050 ml         Physical Exam Performed:    BP (!) 148/89   Pulse 53   Temp 98.1 °F (36.7 °C) (Oral)   Resp 16   Ht 6' (1.829 m)   Wt 250 lb (113.4 kg)   SpO2 93%   BMI 33.91 kg/m²     General appearance:  No apparent distress, appears stated age  Eyes: Pupils equal, round, reactive to light, conjunctiva/corneas clear  Ears/Nose/Mouth/Throat: No external lesions or scars, hearing intact to voice  Neck: Trachea midline, no masses noted  Respiratory:  Normal respiratory effort. Clear to auscultation bilaterally  Cardiovascular: Regular rate and rhythm, nl S1/S2, w/o murmurs, rubs or gallops, no lower extremity edema  Abdomen: Soft, non-tender, non-distended, no hepatosplenomegaly  Musculoskeletal: No cyanosis, clubbing or petechiae, no lower extremity misalignment, asymmetry, or crepitation  Skin: Normal skin color, texture, turgor.  No rashes or

## 2020-01-25 NOTE — PROGRESS NOTES
Neurology Progress Note    Patient: Sushma Rivas MRN: [id number]    YOB: 1965  Age: 62 y.o. Sex: male   Unit: [facility] Room/Bed: 8284/5320-85 Location: [address] [place] [address]    Today's Date: 1/25/2020  Date of Admission: 1/23/2020  8:21 PM  Admitting Physician: Fortino Hooper    Primary Care Physician: Lin Branes MD          LOS: 2 days      ASSESSMENT & RECOMMENDATIONS     Assessment  - MRI shows left pontine infarct, likely secondary to small vessel disease and not embolic    Recommendations  - Agree with Plavix load 300mg followed by 75mg daily along with ASA 81mg daily for next 30d  - At that point should switch to ASA 325mg monotherapy  - Maintain good secondary prevention of stroke measures including SBP < 140 mmHg AND DBP < 90 mmHg; euglycemia (HbA1c <7%); and LDL < 100 mg/dL, ideally < 70 mg/dL  - No need for further cardiac workup at this point  - He will follow-up with me in about 4 weeks. SUBJECTIVE     Chart reviewed, events noted, pt seen & examined. No acute events overnight. Afebrile. Feels much better today. States that he is at least 80% back to normal.     Review of Systems  No changes since pt last seen other than those noted above. PFSH  No change since the original history and note.      OBJECTIVE     Patient Vitals for the past 24 hrs:   BP Temp Temp src Pulse Resp SpO2   01/25/20 1036 (!) 148/89 98.1 °F (36.7 °C) Oral 53 16 93 %   01/25/20 0727 122/78 98.1 °F (36.7 °C) Oral 53 16 97 %   01/25/20 0332 -- -- -- -- 16 --   01/24/20 2206 134/79 98.3 °F (36.8 °C) Oral 51 16 96 %   01/24/20 2150 -- -- -- -- 16 --   01/24/20 1913 (!) 167/91 98.1 °F (36.7 °C) Oral 58 17 95 %   01/24/20 1445 (!) 143/90 98.2 °F (36.8 °C) Oral 57 16 94 %   01/24/20 1223 (!) 162/82 97.4 °F (36.3 °C) Oral 57 16 96 %       Neurological Exam (performed on 1/25/2020 at 1200):  -Mental status: A&O x3; pleasant & appropriate  -Speech & Language: no aphasia; no dysarthria  -Cranial nerves: pupils symmetric; no notable dysconjugate gaze; eyes midline; no facial asymmetry  -Motor: moving all extremities symmetrically and fully  -Other: no adventitious movements noted  Other Systems  -General Appearance: well-developed, well-nourished, no apparent distress  -Neck: supple  -Lungs: breathing unlabored, regular, no audible wheezes  -CV: pulses strong x4 extremities  -Abd: flat     Imaging: All reports below, not included in previous notes, personally reviewed & actual images reviewed where indicated. Pertinent positives & negatives are addressed in Assessment & Plan section of note  MRI brain without contrast  Images independently reviewed. Agree with findings. --[facility]    Final Result   Acute ischemic infarction involving the left aspect of misti. Otherwise, normal MRI of the brain. CTA HEAD NECK W CONTRAST  Images independently reviewed. Agree with findings. --[facility]    Final Result   No carotid or vertebral artery stenosis below skull base   No intracranial large vessel occlusion or significant stenosis      CT HEAD WO CONTRAST  Images independently reviewed. Agree with findings. --[facility]NR[facility]    Final Result   1. No intracranial hemorrhage, mass effect or large acute territorial infarction           Other Testing:  -  Echo pending      Laboratory Review: All results below, not included in previous notes, personally reviewed.  Pertinent positives & negatives are addressed in Assessment & Plan section of note  Recent Results (from the past 36 hour(s))   Hemoglobin A1c    Collection Time: 01/24/20  2:17 AM   Result Value Ref Range    Hemoglobin A1C 6.5 See comment %    eAG 139.9 mg/dL   Lipid panel - fasting    Collection Time: 01/24/20  2:17 AM   Result Value Ref Range    Cholesterol, Total 150 0 - 199 mg/dL    Triglycerides 124 0 - 150 mg/dL    HDL 40 40 - 60 mg/dL    LDL Calculated 85 <100 mg/dL    VLDL Cholesterol Calculated 25 Not Established mg/dL   Basic metabolic panel    Collection Time: 01/24/20  2:17 AM   Result Value Ref Range    Sodium 138 136 - 145 mmol/L    Potassium 3.5 3.5 - 5.1 mmol/L    Chloride 99 99 - 110 mmol/L    CO2 24 21 - 32 mmol/L    Anion Gap 15 3 - 16    Glucose 151 (H) 70 - 99 mg/dL    BUN 12 7 - 20 mg/dL    CREATININE 0.8 (L) 0.9 - 1.3 mg/dL    GFR Non-African American >60 >60    GFR African American >60 >60    Calcium 9.4 8.3 - 10.6 mg/dL   Urinalysis Reflex to Culture    Collection Time: 01/24/20  5:29 AM   Result Value Ref Range    Color, UA Yellow Straw/Yellow    Clarity, UA Clear Clear    Glucose, Ur Negative Negative mg/dL    Bilirubin Urine Negative Negative    Ketones, Urine Negative Negative mg/dL    Specific Gravity, UA 1.020 1.005 - 1.030    Blood, Urine TRACE-INTACT (A) Negative    pH, UA 6.0 5.0 - 8.0    Protein, UA Negative Negative mg/dL    Urobilinogen, Urine 0.2 <2.0 E.U./dL    Nitrite, Urine Negative Negative    Leukocyte Esterase, Urine Negative Negative    Microscopic Examination YES     Urine Type NotGiven     Urine Reflex to Culture Not Indicated    Microscopic Urinalysis    Collection Time: 01/24/20  5:29 AM   Result Value Ref Range    WBC, UA None seen 0 - 5 /HPF    RBC, UA 5-10 (A) 0 - 2 /HPF       Scheduled Meds:   clopidogrel  300 mg Oral Once    [Held by provider] amLODIPine  5 mg Oral Nightly    nebivolol  10 mg Oral Daily    losartan-hydrochlorothiazide  1 tablet Oral Daily    sodium chloride flush  10 mL Intravenous 2 times per day    enoxaparin  40 mg Subcutaneous Daily    aspirin  81 mg Oral Daily    Or    aspirin  300 mg Rectal Daily    atorvastatin  80 mg Oral Nightly       Continuous Infusions:       PRN Meds:  sodium chloride flush, 10 mL, PRN  magnesium hydroxide, 30 mL, Daily PRN  ondansetron, 4 mg, Q6H PRN  labetalol, 10 mg, Q10 Min PRN                Discussed at length with patient and nursing    Tory Sethi M.D.   Neurology  January 25, 2020

## 2020-01-25 NOTE — DISCHARGE SUMMARY
texture, turgor. No rashes or lesions noted. Psychiatric: Alert and oriented x4, good insight and judgment      Labs: For convenience and continuity at follow-up the following most recent labs are provided:      CBC:    Lab Results   Component Value Date    WBC 12.3 01/23/2020    HGB 15.6 01/23/2020    HCT 44.8 01/23/2020     01/23/2020       Renal:    Lab Results   Component Value Date     01/24/2020    K 3.5 01/24/2020    CL 99 01/24/2020    CO2 24 01/24/2020    BUN 12 01/24/2020    CREATININE 0.8 01/24/2020    CALCIUM 9.4 01/24/2020         Significant Diagnostic Studies    Radiology:   MRI brain without contrast   Final Result      Acute ischemic infarction involving the left aspect of misti. Otherwise, normal MRI of the brain. CTA HEAD NECK W CONTRAST   Final Result      No carotid or vertebral artery stenosis below skull base         CTA HEAD:      ANTERIOR CIRCULATION: There is mild calcification of the left internal carotid artery cavernous segment without evidence for significant stenosis. The intracranial right internal carotid artery is normal. Bilateral anterior and middle cerebral arteries    are normal. There is patent anterior communicating artery. POSTERIOR CIRCULATION: There is mild focal calcification of the intracranial right internal carotid artery without significant stenosis. Left internal carotid artery is normal. The basilar artery and branch vessels are normal. Prominent right posterior    communicating artery is visible. INTRACRANIAL VENOUS SYSTEM: No evidence for intracranial venous thrombosis. INTRACRANIAL HEMORRHAGE: None. VENTRICLES: Normal in size and configuration for age. BRAIN PARENCHYMA: Gray-white matter differentiation is normal. No intracranial mass effect. SKULL: No destructive osseous process or fracture. PARANASAL SINUSES / MASTOIDS: No acute sinusitis or mastoiditis.       EXTRACRANIAL SOFT TISSUES: Normal.

## 2020-01-25 NOTE — PROGRESS NOTES
Physical Therapy  Facility/Department: [facility] 5T ORTHO/NEURO  Daily Treatment Note  NAME: Celsa De La oTrre  : 1965  MRN: [id number]    Date of Service: 2020    Discharge Recommendations:    Celsa De La Torre scored a 23/24 on the AM-PAC short mobility form. Current research shows that an AM-PAC score of 17 or less is typically not associated with a discharge to the patient's home setting. Based on the patients AM-PAC score and their current functional mobility deficits, it is recommended that the patient have 5-7 sessions per week of Physical Therapy at d/c to increase the patients independence. Assessment   Body structures, Functions, Activity limitations: Decreased functional mobility ; Decreased coordination;Decreased ADL status; Decreased safe awareness;Decreased high-level IADLs;Decreased cognition;Decreased fine motor control;Decreased balance  Assessment: Pt with reducing deficits following CVA, did notice some dynamic standing/gait deficits that would warrant further outpatient PT services. Pt agreeable. Pt tentative D/C today home with supervision. Treatment Diagnosis: Gait difficulty 2/2 CVA  Decision Making: Medium Complexity  PT Education: PT Role;Plan of Care;General Safety;Gait Training;Transfer Training;Functional Mobility Training;Disease Specific Education  REQUIRES PT FOLLOW UP: Yes     Patient Diagnosis(es): The primary encounter diagnosis was Cerebrovascular accident (CVA), unspecified mechanism ([facility] [address] A diagnosis of Acute CVA (cerebrovascular accident) [facility]) was also pertinent to this visit. has a past medical history of Hypertension. has no past surgical history on file. Restrictions  Position Activity Restriction  Other position/activity restrictions: Up as tolerated  Subjective   General  Chart Reviewed: Yes  Additional Pertinent Hx:  62 y.o. male admit  for slurred speech and R sided weakness, difficulty walking. CT head and neck (-) for acute findings.  MRI

## 2020-01-26 ASSESSMENT — ENCOUNTER SYMPTOMS
SHORTNESS OF BREATH: 0
EYE PAIN: 0
COUGH: 0
ABDOMINAL PAIN: 0
SORE THROAT: 0
DIARRHEA: 0
ABDOMINAL DISTENTION: 0
COLOR CHANGE: 0
TROUBLE SWALLOWING: 0
CHEST TIGHTNESS: 0
NAUSEA: 0
WHEEZING: 0
VOMITING: 0
RHINORRHEA: 0

## 2020-01-27 NOTE — ED PROVIDER NOTES
[address] [facility] [address] ENCOUNTER          PHYSICIAN ASSISTANT NOTE       Date of evaluation: 1/23/2020    Chief Complaint     Aphasia and Gait Problem      History of Present Illness     Jhonatan Latif is a 62 y.o. male with a past medical history as noted below who presents to the Emergency Department with a complaint of a change in speech and right-sided weakness. The patient reports that at approximately 930 this morning, he noted that his speech had become more slurred than usual and that he was having a difficulty enunciating words. He reports that he can find the words that he wanted to say, but he had a difficult time getting the words out. He did not seek medical care at that time. The symptoms continued through much of the day until approximately 7:30 PM tonight, when he began to note some weakness in his right leg. He reports that he felt a little bit off balance, and would favor his right side as he was walking, often causing his gait to steer right. He also noted that he felt slightly weak in his left arm as well as some decrease in sensation to the left side of the face. As the speech symptoms had not subsided, the patient became concerned and activated EMS. On EMS evaluation, they reported a negative [facility] stroke scale noting that the patient had some slight slurring of speech, but no pronator drift. The patient has no history of cardiovascular disease or stroke. He does have a history of hypertension and obstructive sleep apnea. He denies any pain. Review of Systems     Review of Systems   Constitutional: Negative for chills, diaphoresis, fatigue and fever. HENT: Negative for congestion, postnasal drip, rhinorrhea, sore throat and trouble swallowing. Eyes: Negative for pain and visual disturbance. Respiratory: Negative for cough, chest tightness, shortness of breath and wheezing.     Cardiovascular: Negative for chest pain, palpitations and leg swelling. Gastrointestinal: Negative for abdominal distention, abdominal pain, diarrhea, nausea and vomiting. Endocrine: Negative for polydipsia and polyuria. Genitourinary: Negative for dysuria. Musculoskeletal: Negative for myalgias, neck pain and neck stiffness. Skin: Negative for color change, pallor and rash. Neurological: Positive for speech difficulty and weakness. Negative for dizziness, light-headedness and headaches. Hematological: Does not bruise/bleed easily. Psychiatric/Behavioral: Negative for confusion, hallucinations, self-injury and suicidal ideas. All other systems reviewed and are negative. Past Medical, Surgical, Family, and Social History     He has a past medical history of Hypertension. He has no past surgical history on file. His family history is not on file. He reports that he has never smoked. He has never used smokeless tobacco. He reports current alcohol use. He reports that he does not use drugs. Medications     Discharge Medication List as of 1/25/2020  2:49 PM      CONTINUE these medications which have NOT CHANGED    Details   amLODIPine (NORVASC) 5 MG tablet Historical Med      BYSTOLIC 10 MG tablet DAWHistorical Med      losartan-hydrochlorothiazide (HYZAAR) 100-12.5 MG per tablet Historical Med             Allergies     He has No Known Allergies. Physical Exam     INITIAL VITALS: BP: (!) 186/89, Temp: 98.9 °F (37.2 °C), Pulse: 62, Resp: 19, SpO2: 98 %  Physical Exam  Constitutional:       General: He is not in acute distress. Appearance: He is well-developed. He is not diaphoretic. HENT:      Head: Normocephalic and atraumatic. Eyes:      General: No visual field deficit. Pupils: Pupils are equal, round, and reactive to light. Neck:      Musculoskeletal: Normal range of motion and neck supple. Vascular: No JVD. Cardiovascular:      Rate and Rhythm: Normal rate and regular rhythm.    Pulmonary:      Effort: Pulmonary effort is normal. No respiratory distress. Breath sounds: Normal breath sounds. No wheezing or rales. Chest:      Chest wall: No tenderness. Abdominal:      General: There is no distension. Palpations: Abdomen is soft. Tenderness: There is no abdominal tenderness. Musculoskeletal: Normal range of motion. Skin:     General: Skin is warm and dry. Coloration: Skin is not pale. Findings: No erythema or rash. Neurological:      Mental Status: He is alert and oriented to person, place, and time. GCS: GCS eye subscore is 4. GCS verbal subscore is 5. GCS motor subscore is 6. Cranial Nerves: Dysarthria present. No facial asymmetry. Sensory: Sensation is intact. Motor: Weakness and pronator drift (R arm, mild) present. No tremor or abnormal muscle tone. Coordination: Coordination normal. Finger-Nose-Finger Test and Heel to Shin Test normal. Rapid alternating movements normal.     NIH Stroke Scale  NIH Stroke Scale Assessed: Yes  Interval: Hand-off/transfer  Level of Consciousness (1a. ): Alert  LOC Questions (1b. ):  Answers both correctly  LOC Commands (1c. ): Performs both tasks correctly  Best Gaze (2. ): Normal  Visual (3. ): No visual loss  Facial Palsy (4. ): Normal symmetrical movement  Motor Arm, Left (5a. ): No drift  Motor Arm, Right (5b. ): No drift  Motor Leg, Left (6a. ): No drift  Motor Leg, Right (6b. ): Drift, but does not hit bed  Limb Ataxia (7. ): (!) Present in one limb  Sensory (8. ): (!) Mild to Moderate  Best Language (9. ): Mild to moderate aphasia  Dysarthria (10. ): Mild to moderate dysarthria  Extinction and Inattention (11): No abnormality  Total: 5      Diagnostic Results     EKG   Interpreted in conjunction with emergency department physician, Johnson Connolly MD,  Rhythm: normal sinus   Rate: bradycardia  Axis: normal  Ectopy: none  Conduction: normal  ST Segments: normal  T Waves: normal  Q Waves:none  Clinical Impression: Sinus bradycardia, normal QTC, no ectopy, no evidence of ischemia, injury or infarct no prior ECG for comparison  Comparison: No prior ECG for comparison    RADIOLOGY:  CTA HEAD NECK W CONTRAST   Final Result      No carotid or vertebral artery stenosis below skull base         CTA HEAD:      ANTERIOR CIRCULATION: There is mild calcification of the left internal carotid artery cavernous segment without evidence for significant stenosis. The intracranial right internal carotid artery is normal. Bilateral anterior and middle cerebral arteries    are normal. There is patent anterior communicating artery. POSTERIOR CIRCULATION: There is mild focal calcification of the intracranial right internal carotid artery without significant stenosis. Left internal carotid artery is normal. The basilar artery and branch vessels are normal. Prominent right posterior    communicating artery is visible. INTRACRANIAL VENOUS SYSTEM: No evidence for intracranial venous thrombosis. INTRACRANIAL HEMORRHAGE: None. VENTRICLES: Normal in size and configuration for age. BRAIN PARENCHYMA: Gray-white matter differentiation is normal. No intracranial mass effect. SKULL: No destructive osseous process or fracture. PARANASAL SINUSES / MASTOIDS: No acute sinusitis or mastoiditis. EXTRACRANIAL SOFT TISSUES: Normal.      IMPRESSION:      No intracranial large vessel occlusion or significant stenosis      CT HEAD WO CONTRAST   Final Result      1.  No intracranial hemorrhage, mass effect or large acute territorial infarction          LABS:   Results for orders placed or performed during the hospital encounter of 01/23/20   CBC Auto Differential   Result Value Ref Range    WBC 12.3 (H) 4.0 - 11.0 K/uL    RBC 4.84 4.20 - 5.90 M/uL    Hemoglobin 15.6 13.5 - 17.5 g/dL    Hematocrit 44.8 40.5 - 52.5 %    MCV 92.6 80.0 - 100.0 fL    MCH 32.3 26.0 - 34.0 pg    MCHC 34.9 31.0 - 36.0 g/dL    RDW 13.5 12.4 - 15.4 %    Platelets 599 815 - 663 K/uL MPV 7.7 5.0 - 10.5 fL    Neutrophils % 63.5 %    Lymphocytes % 25.3 %    Monocytes % 8.7 %    Eosinophils % 1.9 %    Basophils % 0.6 %    Neutrophils Absolute 7.8 (H) 1.7 - 7.7 K/uL    Lymphocytes Absolute 3.1 1.0 - 5.1 K/uL    Monocytes Absolute 1.1 0.0 - 1.3 K/uL    Eosinophils Absolute 0.2 0.0 - 0.6 K/uL    Basophils Absolute 0.1 0.0 - 0.2 K/uL   Protime-INR   Result Value Ref Range    Protime 11.2 10.0 - 13.2 sec    INR 0.97 0.86 - 1.14   APTT   Result Value Ref Range    aPTT 28.8 24.2 - 36.2 sec   Troponin   Result Value Ref Range    Troponin <0.01 <0.01 ng/mL   Brain Natriuretic Peptide   Result Value Ref Range    Pro-BNP 42 0 - 124 pg/mL   Hepatic Function Panel   Result Value Ref Range    Total Protein 7.6 6.4 - 8.2 g/dL    Alb 4.5 3.4 - 5.0 g/dL    Alkaline Phosphatase 73 40 - 129 U/L    ALT 50 (H) 10 - 40 U/L    AST 33 15 - 37 U/L    Total Bilirubin 0.7 0.0 - 1.0 mg/dL    Bilirubin, Direct <0.2 0.0 - 0.3 mg/dL    Bilirubin, Indirect see below 0.0 - 1.0 mg/dL   Urinalysis Reflex to Culture   Result Value Ref Range    Color, UA Yellow Straw/Yellow    Clarity, UA Clear Clear    Glucose, Ur Negative Negative mg/dL    Bilirubin Urine Negative Negative    Ketones, Urine Negative Negative mg/dL    Specific Gravity, UA 1.020 1.005 - 1.030    Blood, Urine TRACE-INTACT (A) Negative    pH, UA 6.0 5.0 - 8.0    Protein, UA Negative Negative mg/dL    Urobilinogen, Urine 0.2 <2.0 E.U./dL    Nitrite, Urine Negative Negative    Leukocyte Esterase, Urine Negative Negative    Microscopic Examination YES     Urine Type NotGiven     Urine Reflex to Culture Not Indicated    Blood Gas, Venous   Result Value Ref Range    pH, Narendra 7.416 7.350 - 7.450    pCO2, Narendra 43.6 41.0 - 51.0 mmHg    pO2, Narendra 41.5 (H) 25.0 - 40.0 mmHg    HCO3, Venous 27.5 24.0 - 28.0 mmol/L    Base Excess, Narendra 3.0 -2.0 - 3.0 mmol/L    O2 Sat, Narendra 79 Not established %    Carboxyhemoglobin 2.3 (H) 0.0 - 1.5 %    MetHgb, Narendra 0.4 0.0 - 1.5 %    TC02 (Calc), Narendra 29 mmol/L    Hemoglobin, Narendra, Reduced 20.60 %   EKG 12 Lead   Result Value Ref Range    Ventricular Rate 56 BPM    Atrial Rate 56 BPM    P-R Interval 188 ms    QRS Duration 118 ms    Q-T Interval 450 ms    QTc Calculation (Bazett) 434 ms    P Axis 55 degrees    R Axis 16 degrees    T Axis 44 degrees    Diagnosis       EKG performed in ER and to be interpreted by ER physician. Confirmed by MD, ER ([id number]),  Bassem Payne ([id number]) on 1/24/2020 10:42:14 AM       ED BEDSIDE ULTRASOUND:  N/A    RECENT VITALS:  BP: (!) 148/89, Temp: 98.1 °F (36.7 °C), Pulse: 53, Resp: 16, SpO2: 93 %     Procedures     N/A    ED Course     Nursing Notes, Past Medical Hx,Past Surgical Hx, Social Hx, Allergies, and Family Hx were reviewed. The patient was given the following medications:  Orders Placed This Encounter   Medications    iopamidol (ISOVUE-370) 76 % injection 80 mL       CONSULTS:  Aidan / JORDAN / Andreas Carrizales is admitted to the Emergency Department for evaluation of his chief complaint as described in the history of present illness. Complete history and physical was performed by me and my attending. Nursing notes, past medical history, surgical history, family history and social history were reviewed and addressed in the HPI. Neida Sierra is a 62 y.o. male who presents to the emergency department with a complaint of a possible stroke. The patient reports that at approximately 9:30 AM, he began to notice a slurring in his speech with dysarthria. This continued throughout the day until approximately 7:30 PM, when he began to note a right sided weakness, in particular in his leg but slightly in his arm with a gait abnormality of his he favored his right side. The patient activated EMS.   Grossly, the patient did not appear to have strokelike symptoms to EMS, however on arrival to the emergency department, the patient demonstrates dysarthria with some mild a aphasia, decreased sensation on the right side of his body and slight pronator drift and right lower extremity weakness though he is able to oppose gravity. The patient was taken immediately to the CT scanner, a code stroke was called and the stroke team notified. The patient's CT scan  demonstrates no evidence of hemorrhagic stroke and no ischemic zones. His CTA demonstrates no significant stenosis or other vascular findings. The stroke team was advised of his NIH stroke scale of a 5, the findings of his CT scan and a last known well time of approximately 9:30 AM.  Given this is 11 hours since the time of onset, the patient is not a candidate for TPA at this time. The patient demonstrates a mild leukocytosis, likely reactive with a white blood cell count of 12.3. No significant anemia. His BMP, BNP, liver function testing, troponin and urinalysis are unremarkable. Given his persistent neurological deficits, we will start the patient on antiplatelet therapy and admit the patient to the hospital for further evaluation and management including neurology consultation and MRI evaluation. I discussed this plan at length the patient who verbalizes understanding and is in agreement. The patient was seen and evaluated by myself and the attending physician, Ruth Campos MD, who agrees with my assessment, treatment and plan. Critical Care:  Due to the immediate potential for life-threatening deterioration due to a stroke, I spent 32 possible strokelike symptoms. minutes providing critical care. This time excludes time spent performing procedures but includes time spent on direct patient care, history retrieval, review of the chart, and discussions with patient, family, and consultant(s). Clinical Impression     1. Cerebrovascular accident (CVA), unspecified mechanism ([address]    2.  Acute CVA (cerebrovascular accident) [facility])        Disposition     PATIENT REFERRED TO:  Parul Mendoza., MD  [id number]  Reggie Rosario MD  [address] [facility] [address]    In 4 weeks  for stroke follow up      DISCHARGE MEDICATIONS:  Discharge Medication List as of 1/25/2020  2:49 PM      START taking these medications    Details   aspirin 81 MG EC tablet Take 1 tablet by mouth daily, Disp-30 tablet, R-0Normal      atorvastatin (LIPITOR) 80 MG tablet Take 1 tablet by mouth nightly, Disp-30 tablet, R-3Normal      clopidogrel (PLAVIX) 75 MG tablet Take 1 tablet by mouth daily, Disp-30 tablet, R-0Normal             DISPOSITION Admitted 01/23/2020 10:22:25 PM       [facility], [id number]  01/26/20 [id number]